# Patient Record
Sex: FEMALE | Race: WHITE | NOT HISPANIC OR LATINO | URBAN - METROPOLITAN AREA
[De-identification: names, ages, dates, MRNs, and addresses within clinical notes are randomized per-mention and may not be internally consistent; named-entity substitution may affect disease eponyms.]

---

## 2017-01-13 ENCOUNTER — EMERGENCY (EMERGENCY)
Facility: HOSPITAL | Age: 27
LOS: 0 days | Discharge: HOME | End: 2017-01-13

## 2017-06-27 DIAGNOSIS — R53.81 OTHER MALAISE: ICD-10-CM

## 2017-06-27 DIAGNOSIS — R11.0 NAUSEA: ICD-10-CM

## 2017-06-27 DIAGNOSIS — F11.23 OPIOID DEPENDENCE WITH WITHDRAWAL: ICD-10-CM

## 2017-06-27 DIAGNOSIS — M79.1 MYALGIA: ICD-10-CM

## 2017-06-27 DIAGNOSIS — Z87.891 PERSONAL HISTORY OF NICOTINE DEPENDENCE: ICD-10-CM

## 2018-04-26 ENCOUNTER — HOSPITAL ENCOUNTER (INPATIENT)
Dept: HOSPITAL 31 - C.ER | Age: 28
LOS: 1 days | Discharge: LEFT BEFORE DISCHARGE | DRG: 743 | End: 2018-04-27
Attending: PSYCHIATRY & NEUROLOGY | Admitting: PSYCHIATRY & NEUROLOGY
Payer: COMMERCIAL

## 2018-04-26 VITALS — BODY MASS INDEX: 27.4 KG/M2

## 2018-04-26 DIAGNOSIS — N39.0: ICD-10-CM

## 2018-04-26 DIAGNOSIS — F11.23: Primary | ICD-10-CM

## 2018-04-26 DIAGNOSIS — F12.20: ICD-10-CM

## 2018-04-26 DIAGNOSIS — Z59.0: ICD-10-CM

## 2018-04-26 LAB
ALBUMIN SERPL-MCNC: 3.9 G/DL (ref 3.5–5)
ALBUMIN/GLOB SERPL: 0.9 {RATIO} (ref 1–2.1)
ALT SERPL-CCNC: 38 U/L (ref 9–52)
AST SERPL-CCNC: 61 U/L (ref 14–36)
BASOPHILS # BLD AUTO: 0.1 K/UL (ref 0–0.2)
BASOPHILS NFR BLD: 1.1 % (ref 0–2)
BILIRUB UR-MCNC: NEGATIVE MG/DL
BUN SERPL-MCNC: 13 MG/DL (ref 7–17)
CALCIUM SERPL-MCNC: 9 MG/DL (ref 8.6–10.4)
EOSINOPHIL # BLD AUTO: 0.1 K/UL (ref 0–0.7)
EOSINOPHIL NFR BLD: 1.7 % (ref 0–4)
ERYTHROCYTE [DISTWIDTH] IN BLOOD BY AUTOMATED COUNT: 13.6 % (ref 11.5–14.5)
GFR NON-AFRICAN AMERICAN: > 60
GLUCOSE UR STRIP-MCNC: NORMAL MG/DL
HCG,QUALITATIVE URINE: NEGATIVE
HGB BLD-MCNC: 11.5 G/DL (ref 11–16)
LEUKOCYTE ESTERASE UR-ACNC: (no result) LEU/UL
LYMPHOCYTES # BLD AUTO: 2.3 K/UL (ref 1–4.3)
LYMPHOCYTES NFR BLD AUTO: 31.6 % (ref 20–40)
MCH RBC QN AUTO: 29.8 PG (ref 27–31)
MCHC RBC AUTO-ENTMCNC: 34.3 G/DL (ref 33–37)
MCV RBC AUTO: 86.8 FL (ref 81–99)
MONOCYTES # BLD: 0.4 K/UL (ref 0–0.8)
MONOCYTES NFR BLD: 6.1 % (ref 0–10)
NEUTROPHILS # BLD: 4.2 K/UL (ref 1.8–7)
NEUTROPHILS NFR BLD AUTO: 59.5 % (ref 50–75)
NRBC BLD AUTO-RTO: 0.1 % (ref 0–2)
PH UR STRIP: 5 [PH] (ref 5–8)
PLATELET # BLD: 180 K/UL (ref 130–400)
PMV BLD AUTO: 8.6 FL (ref 7.2–11.7)
PROT UR STRIP-MCNC: (no result) MG/DL
RBC # BLD AUTO: 3.87 MIL/UL (ref 3.8–5.2)
RBC # UR STRIP: (no result) /UL
SP GR UR STRIP: 1.03 (ref 1–1.03)
SQUAMOUS EPITHIAL: 64 /HPF (ref 0–5)
UROBILINOGEN UR-MCNC: 2 MG/DL (ref 0.2–1)
WBC # BLD AUTO: 7.1 K/UL (ref 4.8–10.8)

## 2018-04-26 PROCEDURE — GZ58ZZZ INDIVIDUAL PSYCHOTHERAPY, COGNITIVE-BEHAVIORAL: ICD-10-PCS | Performed by: PSYCHIATRY & NEUROLOGY

## 2018-04-26 PROCEDURE — HZ59ZZZ INDIVIDUAL PSYCHOTHERAPY FOR SUBSTANCE ABUSE TREATMENT, SUPPORTIVE: ICD-10-PCS | Performed by: PSYCHIATRY & NEUROLOGY

## 2018-04-26 PROCEDURE — HZ52ZZZ INDIVIDUAL PSYCHOTHERAPY FOR SUBSTANCE ABUSE TREATMENT, COGNITIVE-BEHAVIORAL: ICD-10-PCS | Performed by: PSYCHIATRY & NEUROLOGY

## 2018-04-26 PROCEDURE — GZ56ZZZ INDIVIDUAL PSYCHOTHERAPY, SUPPORTIVE: ICD-10-PCS | Performed by: PSYCHIATRY & NEUROLOGY

## 2018-04-26 PROCEDURE — HZ2ZZZZ DETOXIFICATION SERVICES FOR SUBSTANCE ABUSE TREATMENT: ICD-10-PCS | Performed by: PSYCHIATRY & NEUROLOGY

## 2018-04-26 PROCEDURE — HZ42ZZZ GROUP COUNSELING FOR SUBSTANCE ABUSE TREATMENT, COGNITIVE-BEHAVIORAL: ICD-10-PCS | Performed by: PSYCHIATRY & NEUROLOGY

## 2018-04-26 PROCEDURE — GZHZZZZ GROUP PSYCHOTHERAPY: ICD-10-PCS | Performed by: PSYCHIATRY & NEUROLOGY

## 2018-04-26 PROCEDURE — HZ46ZZZ GROUP COUNSELING FOR SUBSTANCE ABUSE TREATMENT, PSYCHOEDUCATION: ICD-10-PCS | Performed by: PSYCHIATRY & NEUROLOGY

## 2018-04-26 PROCEDURE — HZ56ZZZ INDIVIDUAL PSYCHOTHERAPY FOR SUBSTANCE ABUSE TREATMENT, PSYCHOEDUCATION: ICD-10-PCS | Performed by: PSYCHIATRY & NEUROLOGY

## 2018-04-26 SDOH — ECONOMIC STABILITY - HOUSING INSECURITY: HOMELESSNESS: Z59.0

## 2018-04-26 NOTE — C.PDOC
History Of Present Illness


Pt is here requesting detox from Heroin.


Time Seen by Provider: 04/26/18 20:07


Chief Complaint (Nursing): Substance Abuse


History Per: Patient


Onset/Duration Of Symptoms: Days


Current Symptoms Are (Timing): Still Present


Suicide/Self Injury Attempted (Context): None


Modifying Factor(s): Narcotics


Severity: Moderate


Associated Symptoms: denies: Suicidal Thoughts, Suicidal Plan


Additional History Per: Prior Records





Past Medical History


Reviewed: Historical Data, Nursing Documentation, Vital Signs


Vital Signs: 





 Last Vital Signs











Temp  98.1 F   04/26/18 22:20


 


Pulse  66   04/26/18 22:20


 


Resp  20   04/26/18 22:20


 


BP  102/62   04/26/18 22:20


 


Pulse Ox  98   04/26/18 22:20














- Medical History


PMH: No Chronic Diseases





- [facility] Procedures











DETOXIFICATION SERVICES FOR SUBSTANCE ABUSE TREATMENT (01/20/16)


EXTRACTION OF POC, LOW CERVICAL, OPEN APPROACH (01/20/16)


GROUP  FOR SUBSTANCE ABUSE TREATMENT, PSYCHOEDUCATION (01/20/16)


OTHER SKIN & SUBQ I   D (03/29/15)








Family History: States: Unknown Family Hx





- Social History


Hx Tobacco Use: Yes


Hx Alcohol Use: No


Hx Substance Use: Yes (IVDU Heroin)





- Immunization History


Hx Tetanus Toxoid Vaccination: No


Hx Influenza Vaccination: No


Hx Pneumococcal Vaccination: No





Review Of Systems


Except As Marked, All Systems Reviewed And Found Negative.


Constitutional: Negative for: Fever


Cardiovascular: Negative for: Chest Pain


Respiratory: Negative for: Shortness of Breath


Gastrointestinal: Negative for: Vomiting, Abdominal Pain


Musculoskeletal: Negative for: Neck Pain, Back Pain


Neurological: Negative for: Weakness, Numbness





Physical Exam





- Physical Exam


Appears: Non-toxic, No Acute Distress


Skin: Normal Color, Warm, Dry


Head: Atraumatic, Normacephalic


Eye(s): bilateral: PERRL, EOMI


Neck: Normal ROM, Supple


Cardiovascular: Rhythm Regular


Respiratory: Normal Breath Sounds, No Accessory Muscle Use


Gastrointestinal/Abdominal: Soft, No Tenderness


Extremity: Normal ROM, Other (track marks on arms)


Neurological/Psych: Oriented x3, Normal Motor, Normal Sensation





ED Course And Treatment





- Laboratory Results


Result Diagrams: 


 04/26/18 21:08





 04/26/18 20:43


Interpretation Of Abnormal: UTI, urine C&S sent.


Urine Pregnancy POC: Negative


O2 Sat by Pulse Oximetry: 98


Pulse Ox Interpretation: Normal


Progress Note: Pt is medically stable for detox admission. Please treat UTI 

with Cipro 500mg po for 5 days and follow up urine culture results.





Disposition


Counseled Patient/Family Regarding: Studies Performed, Diagnosis





- Disposition


Disposition: HOSPITALIZED


Disposition Time: 22:26


Condition: STABLE





- Clinical Impression


Clinical Impression: 


 Heroin dependence, UTI (urinary tract infection)








Decision To Admit





- Pt Status Changed To:


Hospital Disposition Of: Inpatient





- Admit Certification


Admit to Inpatient:: After my assessment, the patient will require 

hospitalization for at least two midnights.  This is because of the severity of 

symptoms shown, intensity of services needed, and/or the medical risk in this 

patient being treated as an outpatient.





- InPatient:


Physician Admission Certification: I certify that this patient requires 2 or 

more midnights of care for the following reason:: Detox.





- .


Bed Request Type: Detox


Admitting Physician: Herminio Rowe


Patient Diagnosis: 


 Heroin dependence, UTI (urinary tract infection)

## 2018-04-27 VITALS
HEART RATE: 86 BPM | TEMPERATURE: 98.7 F | SYSTOLIC BLOOD PRESSURE: 104 MMHG | OXYGEN SATURATION: 100 % | DIASTOLIC BLOOD PRESSURE: 66 MMHG

## 2018-04-27 VITALS — RESPIRATION RATE: 20 BRPM

## 2018-04-27 NOTE — PCM.PYCHDC
Mental Status Examination





- Mental Status Examination


Orientation: Person, Place, Situation, Time


Memory: Intact


Mood: Neutral


Affect: Constricted


Speech: Soft


Attention: WNL


Concentration: WNL


Association: WNL


Fund of Knowledge: WNL


Formal Thought Process: No Impairment


Description of patient's judgement and insight: 





partially impaired


Psychotic Thoughts and Behaviors: 





denies any AVH


Suicidal Ideation: No


Current Homicidal Ideation?: No





Discharge Summary





- Discharge Note


Reason for Hospitalization: 





The pt is seen,chart reviewed and case discussed


She is apoor historian and not very cooperative due to significant withdrawal 

sxs





Patient is 27-year-old single,  female presenting to the ED for Opiate 

Detoxification. 


She has a 2.4 y/o child who was taken away by [facility] at birth due to her heroin 

use during pregnancy. Pt is single, homeless and unemployed. 





Patient reports she intravenously uses 30 bags of heroin daily for over 6 years 

and smokes a dime bag of marijuana weekly over 5 years.  Patient denies cocaine 

use and not familiar with Barbiturates, however her urine toxicology returned 

positive for both (in addition to marijuana and opiates).  She believes they 

were in the heroin bag. Patient reports two past detox admissions both at [facility] (formally known as [facility]).  Her most recent admission was 

March 2018 when she left [place] because she couldn't take the withdrawal symptoms.

  She is given methadone detox but not feeling well yet.





Patient denies psychiatric history or medical issues, denies past/present 

suicidal and homicidal ideation and psychosis.  


Unknown family psych hx








Laboratory Data: 





 Abnormal Lab Results











  04/26/18 04/26/18 04/26/18





  20:43 20:43 20:43


 


WBC   


 


RBC   


 


Hgb   


 


Hct   


 


MCV   


 


MCH   


 


MCHC   


 


RDW   


 


Plt Count   


 


MPV   


 


Neut % (Auto)   


 


Lymph % (Auto)   


 


Mono % (Auto)   


 


Eos % (Auto)   


 


Baso % (Auto)   


 


Neut # (Auto)   


 


Lymph # (Auto)   


 


Mono # (Auto)   


 


Eos # (Auto)   


 


Baso # (Auto)   


 


Sodium   143 


 


Potassium   4.7 


 


Chloride   104 


 


Carbon Dioxide   25 


 


Anion Gap   18 


 


BUN   13 


 


Creatinine   0.6 L 


 


Est GFR ( Amer)   > 60 


 


Est GFR (Non-Af Amer)   > 60 


 


Random Glucose   87 


 


Calcium   9.0 


 


Total Bilirubin   1.1 


 


AST   61 H 


 


ALT   38 


 


Alkaline Phosphatase   37 L 


 


Total Protein   8.1 


 


Albumin   3.9 


 


Globulin   4.2 H 


 


Albumin/Globulin Ratio   0.9 L 


 


Urine Color  Blanca  


 


Urine Clarity  Hazy  


 


Urine pH  5.0  


 


Ur Specific Gravity  1.026  


 


Urine Protein  1+ H  


 


Urine Glucose (UA)  Normal  


 


Urine Ketones  Trace  


 


Urine Blood  1+ H  


 


Urine Nitrate  Positive H  


 


Urine Bilirubin  Negative  


 


Urine Urobilinogen  2.0 H  


 


Ur Leukocyte Esterase  1+ H  


 


Urine WBC (Auto)  20 H  


 


Urine RBC (Auto)  4 H  


 


Ur Squamous Epith Cells  64 H  


 


Urine HCG, Qual  Negative  


 


Urine Opiates Screen    Positive H


 


Urine Methadone Screen    Negative


 


Ur Barbiturates Screen    Positive H


 


Ur Phencyclidine Scrn    Negative


 


Ur Amphetamines Screen    Negative


 


U Benzodiazepines Scrn    Negative


 


U Oth Cocaine Metabols    Positive H


 


U Cannabinoids Screen    Positive H


 


Alcohol, Quantitative   < 10 














  04/26/18





  21:08


 


WBC  7.1


 


RBC  3.87


 


Hgb  11.5  D


 


Hct  33.6 L


 


MCV  86.8  D


 


MCH  29.8


 


MCHC  34.3


 


RDW  13.6


 


Plt Count  180


 


MPV  8.6


 


Neut % (Auto)  59.5


 


Lymph % (Auto)  31.6


 


Mono % (Auto)  6.1


 


Eos % (Auto)  1.7


 


Baso % (Auto)  1.1


 


Neut # (Auto)  4.2


 


Lymph # (Auto)  2.3


 


Mono # (Auto)  0.4


 


Eos # (Auto)  0.1


 


Baso # (Auto)  0.1


 


Sodium 


 


Potassium 


 


Chloride 


 


Carbon Dioxide 


 


Anion Gap 


 


BUN 


 


Creatinine 


 


Est GFR ( Amer) 


 


Est GFR (Non-Af Amer) 


 


Random Glucose 


 


Calcium 


 


Total Bilirubin 


 


AST 


 


ALT 


 


Alkaline Phosphatase 


 


Total Protein 


 


Albumin 


 


Globulin 


 


Albumin/Globulin Ratio 


 


Urine Color 


 


Urine Clarity 


 


Urine pH 


 


Ur Specific Gravity 


 


Urine Protein 


 


Urine Glucose (UA) 


 


Urine Ketones 


 


Urine Blood 


 


Urine Nitrate 


 


Urine Bilirubin 


 


Urine Urobilinogen 


 


Ur Leukocyte Esterase 


 


Urine WBC (Auto) 


 


Urine RBC (Auto) 


 


Ur Squamous Epith Cells 


 


Urine HCG, Qual 


 


Urine Opiates Screen 


 


Urine Methadone Screen 


 


Ur Barbiturates Screen 


 


Ur Phencyclidine Scrn 


 


Ur Amphetamines Screen 


 


U Benzodiazepines Scrn 


 


U Oth Cocaine Metabols 


 


U Cannabinoids Screen 


 


Alcohol, Quantitative 











Consultations:: List each consultation separately and include:  1. Reason for 

request.  2. Findings.  3. Follow-up


Summary of Hospital Course include:: 1. Description of specific treatment plan 

utilized for patients during their course of treatmen.  2. Summarize the time-

course for resolution of acute symptoms and/or regressed behaviors.  3. 

Describe issues identified and worked on during hospitalization.  4. Describe 

medication utilized.  5. Describe medical problems identified and treated.  6. 

Reassessment of suicide risk


Summary of Hospital Course: 





As per the staff, patient reported improvement in her symptoms and reported 

improvement in nausea, vomiting and cramps. However she signed AMA. As per the 

patient she is doing much better and her withdrawal symptoms are getting better 

and she does not need 4 days of detox. 





Patient remained calm and cooperative. She denied any shakes, sweating or any 

other withdrawal symptoms. She denied any feelings of hopelessness, helplessness

, and worthlessness. She denied any suicidal ideation or homicidal ideation, 

and denied any auditory or visual hallucinations. 








- Final Diagnosis (DSM 5)


Condition upon Discharge: STABLE


DSM 5: 





Opioid withdrawal


Opioid use d/o - severe


cannabis use d/o- severe


Depressive d/o -unspecified





Disposition: AGAINST MEDICAL ADVICE


Follow-up Treatment Plan: 








Education:


Pt was educated and counseled about the risks and benefits of taking and not 

taking medications.  


Pt was educated and counseled about the risks of drinking and abusing drugs. 


Pt was educated and counseled to go to the ER or call 911 if pt develop 

suicidal ideation or homicidal ideation, worsening of symptoms or severe side 

effects of the meds.








- Smoking Cessation


Smoking Cessation Medication prescribed: No





- Antipsychotic Medications


Pt discharged on 2 or more routine antipsychotic medications: No

## 2018-04-27 NOTE — PCM.BM
Treatment Plan Problems





- Problems identified on initial assessmt


  ** potential for opiate withdrawal symptoms


Date Initiated: 04/27/18


Assessment reference: NA


Status: Active





Treatment assets and liabiliti


Patient Assests: adapts well, cooperative, motivated, ADL independent, 

negotiates basic needs


Patient Liabilities: substance abuse





- Milieu Protocol


Maintain good personal hygiene: daily Encourage regular showers, daily Remind 

patient to perform daily oral care, daily Assist patient to perform ADL's


Conduct patient checks and document Observation sheet: Q15 minutes


Maintain personal safety: every shift Educate patient to report safety concerns 

to staff, every shift Monitor environment for contraband/sharps


Medication safety: Monitor for expected outcome, potential side effects: every 

shift, Assess barriers to learning: every shift, Assess readiness for 

medication education: every shift

## 2018-04-27 NOTE — PCM.PSYCH
Initial Psychiatric Evaluation





- Initial Psychiatric Evaluation


Type of Admission: Voluntary


Legal Status: Capacity


Chief Complaint (in patient's own words): 





"Withdrawing"


History of Present Illness and Precipitating Events: 


The pt is seen,chart reviewed and case discussed


She is apoor historian and not very cooperative due to significant withdrawal 

sxs





Patient is 27-year-old single,  female presenting to the ED for Opiate 

Detoxification. 


She has a 2.6 y/o child who was taken away by [facility] at birth due to her heroin 

use during pregnancy. Pt is single, homeless and unemployed. 





Patient reports she intravenously uses 30 bags of heroin daily for over 6 years 

and smokes a dime bag of marijuana weekly over 5 years.  Patient denies cocaine 

use and not familiar with Barbiturates, however her urine toxicology returned 

positive for both (in addition to marijuana and opiates).  She believes they 

were in the heroin bag. Patient reports two past detox admissions both at [facility] (formally known as [facility]).  Her most recent admission was 

March 2018 when she left [place] because she couldn't take the withdrawal symptoms.

  She is given methadone detox but not feeling well yet.





Patient denies psychiatric history or medical issues, denies past/present 

suicidal and homicidal ideation and psychosis.  


Unknown family psych hx


Current Medications: 





Active Medications











Generic Name Dose Route Start Last Admin





  Trade Name Freq  PRN Reason Stop Dose Admin


 


Ciprofloxacin  500 mg  04/27/18 10:00  04/27/18 09:53





  Cipro  PO  05/04/18 10:01  500 mg





  BID SHMUEL   Administration





  Protocol   


 


Clonidine HCl  0.1 mg  04/27/18 00:29  





  Catapres  PO   





  Q6 PRN   





  withdrawal symptoms   


 


Hydroxyzine HCl  50 mg  04/27/18 06:22  





  Atarax  PO   





  Q6 PRN   





  Anxiety   


 


Ibuprofen  600 mg  04/27/18 06:21  





  Motrin Tab  PO   





  Q6H PRN   





  Pain, moderate (4-7)   


 


Methadone HCl  25 mg  04/27/18 10:00  04/27/18 09:52





  Methadone  PO  05/02/18 09:59  25 mg





  Q24H SHMUEL   Administration





  Taper   


 


Trazodone HCl  50 mg  04/27/18 00:28  04/27/18 00:46





  Desyrel  PO   50 mg





  HS PRN   Administration





  Insomnia   














Past Psychiatric History





- Past Psychiatric History


Previous Treatment History: None


Pertinent Medical Hx (Current Medical&Sleep Prob, Allergies): 





 Allergies











Allergy/AdvReac Type Severity Reaction Status Date / Time


 


No Known Allergies Allergy   Unverified 04/26/18 20:03








 





No Known Home Med  04/26/18 











Review of Systems





- Gastrointestinal


Gastrointestinal: Abdominal Pain





- Musculoskeletal


Musculoskeletal: Arthralgias





- Neurological


Neurological: Restless Legs, Tremor





- Psychiatric


Psychiatric: Abnormal Sleep Pattern, Anxiety, Depression, Difficulty 

Concentrating, Mood Swings.  absent: Hallucinations, Homicidal Ideation, 

Paranoia, Suicidal Ideation





Mental Status Examination





- Personal Presentation


Personal Presentation: Looks stated age (unkempt)





- Affect


Affect: Constricted





- Motor Activity


Motor Activity: Calm





- Reliability in Providing Information


Reliability in Providing Information: Fair





- Speech


Speech: Organized





- Mood


Mood: Depressed, Anxious





- Formal Thought Process


Formal Thought Process: No Impairment





- Cognitive Functions


Orientation: Person, Place, Situation, Time


Sensorium: Drowsy


Attention/Concentration: Easily distracted


Abstract Thinking: [place]


Estimate of Intelligence: Average


Judgement: Intact, as evidence by: Insight regarding need for hospitalization


Memory: Recent intact, as evidence by: Ability to recall events of the day, 

Remote impaired as evidenced by: Inability to recall historical events





- Risk


Risk: Withdrawal, Diminished functioning





- Strength & Assets Inventory


Strength & Assets Inventory: Cooperative





- Limitations


Limitations: Living alone, Other





DSM 5 DX





- DSM 5


DSM 5 Diagnosis: 





Opioid withdrawal


Opioid use d/o - severe


cannabis use d/o- severe


Depressive d/o -unspecified





- Recommended/Plan of Treatment


Treatment Recommendations and Plan of Treatment: 





Taper with methadone


Gabapentin for augmentation if needed


As needed medications


All risks, benefits and alternatives of the meds discussed,


 and the pt agreed and understood. 


Attend groups and activities


Supportive therapy and psychoeducation


MI for abstinence


CBT for relapse prevention


Encourage MAT


Refer to rehab or IOP, and self-help groups


Smoking cessation with MI


Nicotine patch if needed





34 min


Projected ELOS: 4-5 days


Prognosis: good with MAT and rehabilitation


Discharge Plan and Discharge Criteria: 





no withdrawal symptoms


Refer to rehabilitation or MAT





- Smoking Cessation


Smoking Cessation Initiated: Yes

## 2018-06-21 ENCOUNTER — HOSPITAL ENCOUNTER (EMERGENCY)
Dept: HOSPITAL 14 - H.ER | Age: 28
Discharge: HOME | End: 2018-06-21
Payer: COMMERCIAL

## 2018-06-21 VITALS
OXYGEN SATURATION: 98 % | SYSTOLIC BLOOD PRESSURE: 99 MMHG | HEART RATE: 65 BPM | TEMPERATURE: 98.1 F | DIASTOLIC BLOOD PRESSURE: 60 MMHG | RESPIRATION RATE: 16 BRPM

## 2018-06-21 VITALS — BODY MASS INDEX: 27.4 KG/M2

## 2018-06-21 DIAGNOSIS — F17.210: ICD-10-CM

## 2018-06-21 DIAGNOSIS — H10.9: Primary | ICD-10-CM

## 2018-06-21 NOTE — ED PDOC
HPI: Eye Injury/Pain


Time Seen by Provider: 06/21/18 15:43


Chief Complaint (Nursing): Eye Problem


Chief Complaint (Provider): left eye irritation


History Per: Patient


History/Exam Limitations: no limitations


Onset/Duration Of Symptoms: Days (x1 week)


Current Symptoms Are (Timing): Still Present


Associated Symptoms: Decreased Vision, Discharge From Eye (clear)


Additional Complaint(s): 





Liana Carrillo is a 27 year old female, with a past medical history of 

Hepatitis C, who presents to the emergency department complaining of a left eye 

irritation associated with light sensitivity onset for x1 week. Patient states 

she has difficulty opening the eye and reports a clear discharge and vision 

change. She denies any injuries or other medical complaints. 





Of note patient has a history of opioid abuse disorder. 


PMD: None provided. 





Past Medical History


Reviewed: Historical Data, Nursing Documentation, Vital Signs


Vital Signs: 


 Last Vital Signs











Temp  98.1 F   06/21/18 15:57


 


Pulse  65   06/21/18 15:57


 


Resp  16   06/21/18 15:57


 


BP  99/60 L  06/21/18 15:57


 


Pulse Ox  98   06/21/18 15:57














- Medical History


PMH: Hepatitis (C)


   Denies: Diabetes, HIV, HTN, Seizures, Sexually Transmitted Disease





- Surgical History


Surgical History: No Surg Hx





- Family History


Family History: States: Unknown Family Hx





- Social History


Current smoker - smoking cessation education provided: Yes (Heavy smoker >10 

cigarettes daily )


Alcohol: None


Drugs: Opiates





- Immunization History


Hx Tetanus Toxoid Vaccination: No


Hx Influenza Vaccination: No


Hx Pneumococcal Vaccination: No





- Home Medications


Home Medications: 


 Ambulatory Orders











 Medication  Instructions  Recorded


 


Gentamicin 0.1% 0.1 / TP BID #1 tube 06/21/18














- Allergies


Allergies/Adverse Reactions: 


 Allergies











Allergy/AdvReac Type Severity Reaction Status Date / Time


 


No Known Allergies Allergy   Unverified 04/26/18 20:03














Review of Systems


ROS Statement: Except As Marked, All Systems Reviewed And Found Negative


Eyes: Positive for: Vision Change (left eye), Redness (left eye)





Physical Exam





- Reviewed


Nursing Documentation Reviewed: Yes


Vital Signs Reviewed: Yes





- Physical Exam


Appears: Positive for: Non-toxic


Head Exam: Positive for: ATRAUMATIC, NORMOCEPHALIC


Skin: Positive for: Normal Color, Warm, Dry


Eye Exam: Positive for: Normal appearance, EOMI, PERRL, Other (Movable film 

over her left eye. No corneal abrasion)


Neck: Positive for: Painless ROM


Respiratory: Negative for: Respiratory Distress


Extremity: Positive for: Normal ROM (upper and lowr extremities).  Negative for

: Deformity, Swelling


Neurologic/Psych: Positive for: Alert, Oriented





- ECG


O2 Sat by Pulse Oximetry: 98 (RA)


Pulse Ox Interpretation: Normal





Medical Decision Making


Medical Decision Making: 





Time: 15:43


Initial Impression:





Initial Plan:





--Flucaine Eye Drops 2 Drop OS


--Reevaluation











--------------------------------------------------------------------------------

-----------------


Scribe Attestation:


Documented by Iam Blair, acting as a scribe for Ching Edwards PA-C





Provider Scribe Attestation:


All medical record entries made by the Scribe were at my direction and 

personally dictated by me. I have reviewed the chart and agree that the record 

accurately reflects my personal performance of the history, physical exam, 

medical decision making, and the department course for this patient. I have 

also personally directed, reviewed, and agree with the discharge instructions 

and disposition.





Disposition





- Clinical Impression


Clinical Impression: 


 Conjunctivitis








- Disposition


Referrals: 


Nickolas Navarro MD [Staff Provider] - 


Disposition: Routine/Home


Disposition Time: 16:40


Condition: STABLE


Additional Instructions: 


Please follow-up with eye specialist in 1-2 days. 


Prescriptions: 


Gentamicin 0.1% 0.1 / TP BID #1 tube


Instructions:  Conjunctivitis ([place])


Forms:  EVRGR (English)